# Patient Record
Sex: MALE | Race: WHITE | Employment: FULL TIME | URBAN - METROPOLITAN AREA
[De-identification: names, ages, dates, MRNs, and addresses within clinical notes are randomized per-mention and may not be internally consistent; named-entity substitution may affect disease eponyms.]

---

## 2021-04-26 ENCOUNTER — HOSPITAL ENCOUNTER (EMERGENCY)
Facility: CLINIC | Age: 26
Discharge: HOME | End: 2021-04-26
Attending: PHYSICIAN ASSISTANT | Admitting: PHYSICIAN ASSISTANT

## 2021-04-26 VITALS
HEART RATE: 78 BPM | DIASTOLIC BLOOD PRESSURE: 61 MMHG | SYSTOLIC BLOOD PRESSURE: 113 MMHG | BODY MASS INDEX: 21.82 KG/M2 | OXYGEN SATURATION: 98 % | TEMPERATURE: 98 F | RESPIRATION RATE: 16 BRPM | HEIGHT: 68 IN | WEIGHT: 144 LBS

## 2021-04-26 DIAGNOSIS — M54.9 BACK PAIN: ICD-10-CM

## 2021-04-26 PROCEDURE — 99282 EMERGENCY DEPT VISIT SF MDM: CPT

## 2021-04-26 RX ORDER — METHOCARBAMOL 500 MG/1
500-1000 TABLET, FILM COATED ORAL 3 TIMES DAILY PRN
Qty: 15 TABLET | Refills: 0 | Status: SHIPPED | OUTPATIENT
Start: 2021-04-26 | End: 2021-05-07

## 2021-04-26 RX ORDER — LIDOCAINE 50 MG/G
1 PATCH TOPICAL EVERY 24 HOURS
Qty: 10 PATCH | Refills: 0 | Status: SHIPPED | OUTPATIENT
Start: 2021-04-26 | End: 2021-05-07

## 2021-04-26 ASSESSMENT — ENCOUNTER SYMPTOMS
FEVER: 0
BACK PAIN: 1
SHORTNESS OF BREATH: 0
COUGH: 0
NUMBNESS: 0

## 2021-04-26 ASSESSMENT — MIFFLIN-ST. JEOR: SCORE: 1617.68

## 2021-04-26 NOTE — ED TRIAGE NOTES
Pt at work carrying something bent back complaining of mid lower back pain - accident occurred last Wednesday - continues to have back pain - denies radiating pain down legs denies numbness or tingling

## 2021-04-26 NOTE — ED PROVIDER NOTES
"  History   Chief Complaint:  Back Pain       HPI   Mayito Galaviz is a 26 year old male who presents with back pain. The patient was carrying metal beam at work 5 days ago when his partner dropped his side and the patient ended up carrying all the weight which caused him to strain his back. He states that the metal beam was around 300 pounds. The back pain does not radiate down his legs and worse with movement. He has tried ibuprofen and Tylenol with minimal relief. He denies any rash, fevers, cough, incontinence, numbness, or shortness of breath. He denies the beam hitting his or any abdominal pain or fevers.      Review of Systems   Constitutional: Negative for fever.   Respiratory: Negative for cough and shortness of breath.    Musculoskeletal: Positive for back pain.   Skin: Negative for rash.   Neurological: Negative for numbness.        No incontinence    All other systems reviewed and are negative.     Allergies:  Penicillins    Medications:  The patient is not on any medications.    Past Medical History:    The patient has no known medical problems.     Social History:  The patient presents alone.   Denies drug use.     Physical Exam     Patient Vitals for the past 24 hrs:   BP Temp Temp src Pulse Resp SpO2 Height Weight   04/26/21 1103 113/61 98  F (36.7  C) Temporal 78 16 98 % 1.727 m (5' 8\") 65.3 kg (144 lb)       Physical Exam  General: Resting comfortably.  Alert and oriented.   Head:  The scalp, face, and head appear normal   Eyes:  Conjunctivae and sclerae are normal    Neck:  There is no midline cervical spine pain/tenderness   CV:  Regular rate and rhythm     Normal S1/S2    DP and PT pulses intact bilaterally    Radial pulses intact bilaterally  Resp:  Lungs are clear to auscultation    Non-labored    No rales or wheezing   GI:  Abdomen is soft, non-distended    No abdominal tenderness   MS:  Tenderness to left paralumbar and left parathoracic area. No midline tenderness. 5/5 strength with hip " flexion/extension, knee flexion/extension, dorsi/plantar flexion, and big toe extension bilaterally.   Skin:  No rash or acute skin lesions noted   Neuro:  Speech is normal and fluent. Sensation intact throughout bilateral lower extremities. Patellar reflexes intact bilaterally.       Emergency Department Course     Emergency Department Course:    Reviewed:  I reviewed the patient's nursing notes, vitals, past medical records, Care Everywhere.     Assessments:  1400  I performed an exam of the patient as documented above.     Disposition:  Discharged to home.      Impression & Plan     Medical Decision Making:  Mayito Galaviz is a 26 year old male who presents for evaluation of back pain that started after lifting a metal beam at work.  He has no history of back pain in the past. The patient did not sustain any trauma, therefore x-rays are not necessary due to the low likelihood of fracture or subluxation.  No red flag symptoms to suggest CT and/or MRI is indicated at this point.  There is no clinical evidence of cauda equina syndrome, discitis, spinal/epidural space hematoma or epidural abscess or other worrisome etiology. The neurological exam is normal and the patient's symptoms seem consistent with musculoskeletal issues. The patient will be discharged with lidocaine patch and robaxin to use as directed.  He understands the Robaxin can make him sedated, and will not take this while driving, etc.  Ice or heat to the back and stretching exercises. No heavy lifting, bending or twisting. he will follow up with his primary care doctor in the next 2-3 days for recheck. Red flag symptoms, and reasons for return were discussed and understood. All questions were answered prior to discharge. The patient understands and agrees to this plan.        Diagnosis:    ICD-10-CM    1. Back pain  M54.9 Primary Care Referral       Discharge Medications:  New Prescriptions    LIDOCAINE (LIDODERM) 5 % PATCH    Place 1 patch onto  the skin every 24 hours for 10 days    METHOCARBAMOL (ROBAXIN) 500 MG TABLET    Take 1-2 tablets (500-1,000 mg) by mouth 3 times daily as needed for muscle spasms       Scribe Disclosure:  I, Evelio Traci, am serving as a scribe at 1:58 PM on 4/26/2021 to document services personally performed by Ksenia Magallon PA based on my observations and the provider's statements to me.         Ksenia Magallon PA-C  04/26/21 0396

## 2021-05-07 ENCOUNTER — OFFICE VISIT (OUTPATIENT)
Dept: INTERNAL MEDICINE | Facility: CLINIC | Age: 26
End: 2021-05-07
Attending: PHYSICIAN ASSISTANT

## 2021-05-07 VITALS
OXYGEN SATURATION: 100 % | HEART RATE: 77 BPM | WEIGHT: 143.5 LBS | DIASTOLIC BLOOD PRESSURE: 70 MMHG | SYSTOLIC BLOOD PRESSURE: 120 MMHG | TEMPERATURE: 97.5 F | BODY MASS INDEX: 21.75 KG/M2 | HEIGHT: 68 IN

## 2021-05-07 DIAGNOSIS — M54.50 BILATERAL LOW BACK PAIN WITHOUT SCIATICA, UNSPECIFIED CHRONICITY: ICD-10-CM

## 2021-05-07 PROCEDURE — 99203 OFFICE O/P NEW LOW 30 MIN: CPT | Performed by: INTERNAL MEDICINE

## 2021-05-07 ASSESSMENT — MIFFLIN-ST. JEOR: SCORE: 1615.41

## 2021-05-07 NOTE — PROGRESS NOTES
"    Assessment & Plan     (M54.9) Back pain  Comment:   *  based on your history and exam, No evidence for herniated disc, spinal stenosis, or vertebral fracture.    *  take over the counter Motrin/Ibuprofen/Advil or Aleve to help relieve pain and inflammation.  follow the directions on the bottle.  Be sure to take with a small meal to prevent stomach upset.      --Motrin 600 mg ( 3 x 200 mg tablets) three times per day every day for 5-7 days, then 2-3 timers per day as needed (take with food to avoid stomach side effects)     OR     --Aleve 2 tablets twice per day for 5-7 days every day, then twice per day as needed     *  do not go out of your way for activity, however, do not avoid basic activates and gentle activity.  Do not lay on the sofa, do not go on bed rest.        *  moist heat as needed like a micorwavable bean bag.  Apply the heat for 10-15 minutes a few times per day as needed.  I would avoid any sort of electrical heating pad out of fear of causing skin burns.  If you do use an electric heating pad, do not use a heating pad for longer than 15 minutes to lower the risk of burns.    *  avoid any lifting for the next 1 week, then a slow return to activity.  Remember to always use proper lifting technique, always bending at the knees rather than the waist.  Your thigh muscles are MUCH stronger than the smaller muscle of your lower back.    *  Physical therapy through [facility] (third floor of the [facility] in [place] and many other locations throughout the [facility]) if you do not get better.    *  expect your back to be more easily re-aggravated over the next few months.  the soft tissue and muscles are going to be more easily re-irritated over the next several weeks so be careful to return to physical action slowly.    Look for back execises on [employer].  (search \"low back pain rehabilitation exercises\")    You need to also include stretches for the " "hamstrings, and hip flexors.     Consider over the counter Lidocaine patches on the affected area.  Place 1-2 Lidocaine patches direclty over the painful area for the most bothersome 12-14 hours of the day, then remove.  You should not wear these patches for more than 12-14 hours per day.   Insurances will almost always not cover lidocaine patches, so you need to get them from the pharmacist.   Plan:                     Return in about 2 weeks (around 5/21/2021) for for recheck, if the symptoms fail to improve.    Shaw Allan MD  [facility] ALEKSEY Edmond is a 26 year old who presents for the following health issues     HPI     ED/UC Followup:    Facility:  [facility] ED  Date of visit: 4/26/2021  Reason for visit: back pain  Current Status: still experiencing back pain, starts in the middle of back and goes down to buttock (nothing into lower legs)  No weakness, no tripping, no stumbling.   Able to walk withou significant abnormality.      No recent injuries or trauma,   No history of significant back problems in the past.  No numbness in legs, no specific lower leg weakness, no radicular symptoms.  No new urinary or fecal incontinence.     **I reviewed the information recorded in the patient's EPIC chart (including but not limited to medical history, surgical history, family history, problem list, medication list, and allergy list) and updated the information as indicated based on the patients reported information.         Review of Systems   Constitutional, HEENT, cardiovascular, pulmonary, gi and gu systems are negative, except as otherwise noted.      Objective    /70   Pulse 77   Temp 97.5  F (36.4  C) (Temporal)   Ht 1.727 m (5' 8\")   Wt 65.1 kg (143 lb 8 oz)   SpO2 100%   BMI 21.82 kg/m    Body mass index is 21.82 kg/m .  Physical Exam   GENERAL alert and no distress  EYES:  Normal sclera,conjunctiva, EOMI  HENT: oral and " posterior pharynx without lesions or erythema, facies symmetric  NECK: Neck supple. No LAD, without thyroidmegaly.  RESP: Clear to ausculation bilaterally without wheezes or crackles. Normal BS in all fields.  CV: RRR normal S1S2 without murmurs, rubs or gallops.  LYMPH: no cervical lymph adenopathy appreciated  MS: extremities- no gross deformities of the visible extremities noted,   EXT:  no lower extremity edema  PSYCH: Alert and oriented times 3; speech- coherent  SKIN:  No obvious significant skin lesions on visible portions of face   BACK:  Pt appears uncomfortable, but not in severe distress.  Pain to palpation of paraspinal lumbar muscles, muscles in spasm, palpation reproduces pain exactly. straight leg-raises negative bilaterally.  Able to move on and off the exam table, no obsevred weakness in the feet or legs with walking, standing, or ambulation. Normal reflexes in the achilles and patellar tendons.

## 2021-05-07 NOTE — LETTER
May 7, 2021      Mayito Galaviz  [address] [place] [address] [id number]  [facility] [id number]        To whom it may concern:     was recently injured in a lifting incident at work where he suffered acute lumbar strain.  He has had improvement since seen in the emergency room, but still has lingering symptoms, enough that he should not perform any heavy lifting or perform general construction activities for the next one week.  He would be OK for office duties.  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 He should be re-evaluated if he has any worsening or fails to improve.       If you have any questions or concerns, please call the clinic at the number listed above.       Sincerely,       Shaw Allan M.D.  Dept. of Internal Medicine  [facility]

## 2022-04-03 ENCOUNTER — HEALTH MAINTENANCE LETTER (OUTPATIENT)
Age: 27
End: 2022-04-03

## 2022-10-03 ENCOUNTER — HEALTH MAINTENANCE LETTER (OUTPATIENT)
Age: 27
End: 2022-10-03

## 2023-05-20 ENCOUNTER — HEALTH MAINTENANCE LETTER (OUTPATIENT)
Age: 28
End: 2023-05-20